# Patient Record
Sex: MALE | Race: WHITE | Employment: OTHER | URBAN - METROPOLITAN AREA
[De-identification: names, ages, dates, MRNs, and addresses within clinical notes are randomized per-mention and may not be internally consistent; named-entity substitution may affect disease eponyms.]

---

## 2023-05-10 ENCOUNTER — PREP FOR PROCEDURE (OUTPATIENT)
Dept: GASTROENTEROLOGY | Age: 63
End: 2023-05-10

## 2023-07-05 RX ORDER — SODIUM CHLORIDE 9 MG/ML
INJECTION, SOLUTION INTRAVENOUS PRN
Status: CANCELLED | OUTPATIENT
Start: 2023-07-05

## 2023-07-05 RX ORDER — SODIUM CHLORIDE 9 MG/ML
INJECTION, SOLUTION INTRAVENOUS CONTINUOUS
Status: CANCELLED | OUTPATIENT
Start: 2023-07-05

## 2023-07-05 RX ORDER — SODIUM CHLORIDE 0.9 % (FLUSH) 0.9 %
5-40 SYRINGE (ML) INJECTION EVERY 12 HOURS SCHEDULED
Status: CANCELLED | OUTPATIENT
Start: 2023-07-05

## 2023-07-07 ENCOUNTER — ANESTHESIA EVENT (OUTPATIENT)
Dept: ENDOSCOPY | Age: 63
End: 2023-07-07
Payer: MEDICARE

## 2023-07-07 ENCOUNTER — ANESTHESIA (OUTPATIENT)
Dept: ENDOSCOPY | Age: 63
End: 2023-07-07
Payer: MEDICARE

## 2023-07-07 ENCOUNTER — HOSPITAL ENCOUNTER (OUTPATIENT)
Age: 63
Setting detail: OUTPATIENT SURGERY
Discharge: HOME | End: 2023-07-07
Attending: INTERNAL MEDICINE | Admitting: INTERNAL MEDICINE
Payer: MEDICARE

## 2023-07-07 VITALS
TEMPERATURE: 97.2 F | HEART RATE: 75 BPM | OXYGEN SATURATION: 99 % | BODY MASS INDEX: 22.19 KG/M2 | HEIGHT: 70 IN | RESPIRATION RATE: 18 BRPM | DIASTOLIC BLOOD PRESSURE: 93 MMHG | WEIGHT: 155 LBS | SYSTOLIC BLOOD PRESSURE: 180 MMHG

## 2023-07-07 PROCEDURE — 3700000001 HC ADD 15 MINUTES (ANESTHESIA): Performed by: INTERNAL MEDICINE

## 2023-07-07 PROCEDURE — 6370000000 HC RX 637 (ALT 250 FOR IP): Performed by: INTERNAL MEDICINE

## 2023-07-07 PROCEDURE — 7100000010 HC PHASE II RECOVERY - FIRST 15 MIN: Performed by: INTERNAL MEDICINE

## 2023-07-07 PROCEDURE — G0105 COLORECTAL SCRN; HI RISK IND: HCPCS | Performed by: INTERNAL MEDICINE

## 2023-07-07 PROCEDURE — 6360000002 HC RX W HCPCS: Performed by: NURSE ANESTHETIST, CERTIFIED REGISTERED

## 2023-07-07 PROCEDURE — 3609027000 HC COLONOSCOPY: Performed by: INTERNAL MEDICINE

## 2023-07-07 PROCEDURE — 2500000003 HC RX 250 WO HCPCS: Performed by: NURSE ANESTHETIST, CERTIFIED REGISTERED

## 2023-07-07 PROCEDURE — 2709999900 HC NON-CHARGEABLE SUPPLY: Performed by: INTERNAL MEDICINE

## 2023-07-07 PROCEDURE — 2580000003 HC RX 258: Performed by: INTERNAL MEDICINE

## 2023-07-07 PROCEDURE — 3700000000 HC ANESTHESIA ATTENDED CARE: Performed by: INTERNAL MEDICINE

## 2023-07-07 RX ORDER — MELOXICAM 15 MG/1
15 TABLET ORAL
COMMUNITY
Start: 2023-05-12

## 2023-07-07 RX ORDER — LIDOCAINE HYDROCHLORIDE 20 MG/ML
INJECTION, SOLUTION EPIDURAL; INFILTRATION; INTRACAUDAL; PERINEURAL PRN
Status: DISCONTINUED | OUTPATIENT
Start: 2023-07-07 | End: 2023-07-07 | Stop reason: SDUPTHER

## 2023-07-07 RX ORDER — PROPOFOL 10 MG/ML
INJECTION, EMULSION INTRAVENOUS PRN
Status: DISCONTINUED | OUTPATIENT
Start: 2023-07-07 | End: 2023-07-07 | Stop reason: SDUPTHER

## 2023-07-07 RX ORDER — SODIUM CHLORIDE 9 MG/ML
INJECTION, SOLUTION INTRAVENOUS PRN
Status: DISCONTINUED | OUTPATIENT
Start: 2023-07-07 | End: 2023-07-07 | Stop reason: HOSPADM

## 2023-07-07 RX ORDER — SODIUM CHLORIDE 9 MG/ML
INJECTION, SOLUTION INTRAVENOUS CONTINUOUS
Status: DISCONTINUED | OUTPATIENT
Start: 2023-07-07 | End: 2023-07-07 | Stop reason: HOSPADM

## 2023-07-07 RX ORDER — QUETIAPINE FUMARATE 100 MG/1
100 TABLET, FILM COATED ORAL NIGHTLY
COMMUNITY

## 2023-07-07 RX ORDER — MAGNESIUM HYDROXIDE 1200 MG/15ML
LIQUID ORAL PRN
Status: DISCONTINUED | OUTPATIENT
Start: 2023-07-07 | End: 2023-07-07 | Stop reason: ALTCHOICE

## 2023-07-07 RX ORDER — BENAZEPRIL HYDROCHLORIDE 5 MG/1
5 TABLET, FILM COATED ORAL DAILY
COMMUNITY
Start: 2023-05-05

## 2023-07-07 RX ORDER — SODIUM CHLORIDE 0.9 % (FLUSH) 0.9 %
5-40 SYRINGE (ML) INJECTION EVERY 12 HOURS SCHEDULED
Status: DISCONTINUED | OUTPATIENT
Start: 2023-07-07 | End: 2023-07-07 | Stop reason: HOSPADM

## 2023-07-07 RX ORDER — BENAZEPRIL HYDROCHLORIDE 20 MG/1
20 TABLET ORAL DAILY
COMMUNITY
Start: 2019-09-10

## 2023-07-07 RX ORDER — SIMETHICONE 20 MG/.3ML
EMULSION ORAL PRN
Status: DISCONTINUED | OUTPATIENT
Start: 2023-07-07 | End: 2023-07-07 | Stop reason: ALTCHOICE

## 2023-07-07 RX ADMIN — PROPOFOL 100 MG: 10 INJECTION, EMULSION INTRAVENOUS at 11:06

## 2023-07-07 RX ADMIN — SODIUM CHLORIDE 500 ML: 9 INJECTION, SOLUTION INTRAVENOUS at 10:29

## 2023-07-07 RX ADMIN — PROPOFOL 100 MG: 10 INJECTION, EMULSION INTRAVENOUS at 11:16

## 2023-07-07 RX ADMIN — PROPOFOL 100 MG: 10 INJECTION, EMULSION INTRAVENOUS at 11:10

## 2023-07-07 RX ADMIN — LIDOCAINE HYDROCHLORIDE 40 MG: 20 INJECTION, SOLUTION EPIDURAL; INFILTRATION; INTRACAUDAL; PERINEURAL at 11:06

## 2023-07-07 RX ADMIN — PROPOFOL 50 MG: 10 INJECTION, EMULSION INTRAVENOUS at 11:22

## 2023-07-07 ASSESSMENT — PAIN - FUNCTIONAL ASSESSMENT: PAIN_FUNCTIONAL_ASSESSMENT: 0-10

## 2023-07-07 NOTE — ANESTHESIA POSTPROCEDURE EVALUATION
Department of Anesthesiology  Postprocedure Note    Patient: Hallie Desouza  MRN: [id number]  YOB: 1960  Date of evaluation: 7/7/2023      Procedure Summary     Date: 07/07/23 Room / Location: [address] [place] [address]    Anesthesia Start: 1103 Anesthesia Stop: 1127    Procedure: COLORECTAL CANCER SCREENING, HIGH RISK Diagnosis:       Personal history of colonic polyps      (Personal history of colonic polyps [Z86.010])    Surgeons: Renate Dancer, MD Responsible Provider: PERRI Castro CRNA    Anesthesia Type: MAC ASA Status: 2          Anesthesia Type: No value filed.     Alicia Phase I:      Alicia Phase II:        Anesthesia Post Evaluation    Patient location during evaluation: bedside  Patient participation: complete - patient participated  Level of consciousness: awake  Airway patency: patent  Nausea & Vomiting: no nausea and no vomiting  Complications: no  Cardiovascular status: blood pressure returned to baseline  Respiratory status: acceptable  Hydration status: euvolemic

## 2023-11-08 ENCOUNTER — ANCILLARY PROCEDURE (OUTPATIENT)
Dept: RADIOLOGY | Facility: CLINIC | Age: 63
End: 2023-11-08
Payer: MEDICARE

## 2023-11-08 DIAGNOSIS — E78.00 PURE HYPERCHOLESTEROLEMIA, UNSPECIFIED: ICD-10-CM

## 2023-11-08 PROCEDURE — 75571 CT HRT W/O DYE W/CA TEST: CPT

## 2024-04-12 ENCOUNTER — APPOINTMENT (OUTPATIENT)
Dept: CARDIOLOGY | Facility: CLINIC | Age: 64
End: 2024-04-12
Payer: MEDICARE

## 2024-04-15 ENCOUNTER — TELEPHONE (OUTPATIENT)
Dept: CARDIOLOGY | Facility: CLINIC | Age: 64
End: 2024-04-15
Payer: MEDICARE

## 2024-04-15 NOTE — TELEPHONE ENCOUNTER
Pt discharged [facility] rehab 4/15 missed 4/12 appt please advise where to add to schedule thanks

## 2024-04-23 ENCOUNTER — TELEPHONE (OUTPATIENT)
Dept: CARDIOLOGY | Facility: CLINIC | Age: 64
End: 2024-04-23
Payer: MEDICARE

## 2024-04-23 NOTE — TELEPHONE ENCOUNTER
V/M on nursing line from Renetta at [facility]. They are requesting permission to hold Aspirin 6 days prior to pain management procedure and resume 24 hours post    Phone [phone]  Fax [phone]    To Dr. Rosangela Cancino MD for review.

## 2024-04-24 ENCOUNTER — TELEPHONE (OUTPATIENT)
Dept: CARDIOLOGY | Facility: CLINIC | Age: 64
End: 2024-04-24
Payer: MEDICARE

## 2024-04-24 RX ORDER — PYRIDOXINE HCL (VITAMIN B6) 100 MG
200 TABLET ORAL DAILY
COMMUNITY

## 2024-04-24 RX ORDER — TRAMADOL HYDROCHLORIDE 50 MG/1
50 TABLET ORAL DAILY PRN
COMMUNITY
Start: 2018-12-07

## 2024-04-24 RX ORDER — CHOLECALCIFEROL (VITAMIN D3) 125 MCG
125 CAPSULE ORAL DAILY
COMMUNITY

## 2024-04-24 RX ORDER — LISINOPRIL 40 MG/1
40 TABLET ORAL DAILY
COMMUNITY
Start: 2024-04-15 | End: 2024-05-06 | Stop reason: SDUPTHER

## 2024-04-24 RX ORDER — METOPROLOL SUCCINATE 25 MG/1
25 TABLET, EXTENDED RELEASE ORAL DAILY
COMMUNITY

## 2024-04-24 RX ORDER — ASCORBIC ACID 500 MG
1000 TABLET,CHEWABLE ORAL DAILY
COMMUNITY

## 2024-04-24 RX ORDER — IBUPROFEN 600 MG/1
600 TABLET ORAL EVERY 6 HOURS PRN
COMMUNITY
Start: 2022-12-06

## 2024-04-24 RX ORDER — MELOXICAM 15 MG/1
15 TABLET ORAL DAILY PRN
COMMUNITY

## 2024-04-24 RX ORDER — DIAZEPAM 5 MG/1
5 TABLET ORAL DAILY PRN
COMMUNITY

## 2024-04-24 RX ORDER — BACLOFEN 10 MG/1
10 TABLET ORAL NIGHTLY
COMMUNITY

## 2024-04-24 RX ORDER — ACETAMINOPHEN 500 MG
1000 TABLET ORAL EVERY 6 HOURS PRN
COMMUNITY
Start: 2022-10-31

## 2024-04-24 RX ORDER — QUETIAPINE FUMARATE 50 MG/1
50 TABLET, FILM COATED ORAL NIGHTLY
COMMUNITY

## 2024-04-24 NOTE — TELEPHONE ENCOUNTER
Patient wife left  inquiring if patient can use meloxicam or ibuprofen for his back pain.  Patient wife reports Tylenol does not help the pain. Patient also has an RX for tramadol. Inquiring to use temporarily until he goes in for spinal injections.       To Dr. Rosangela Cancino MD

## 2024-04-25 NOTE — TELEPHONE ENCOUNTER
Patient was seen in hospital with consult by you. Notes under media tab    To Dr. Rosangela Cancino MD for review.

## 2024-04-26 NOTE — TELEPHONE ENCOUNTER
Wife called into office looking for response. Advised awaiting response. Inquiring if any of his medications will interact with cardiac medications?

## 2024-04-29 ENCOUNTER — OFFICE VISIT (OUTPATIENT)
Dept: OPHTHALMOLOGY | Facility: CLINIC | Age: 64
End: 2024-04-29
Payer: MEDICARE

## 2024-04-29 DIAGNOSIS — Z86.73: Primary | ICD-10-CM

## 2024-04-29 DIAGNOSIS — H53.461 HEMIANOPIA, HOMONYMOUS, RIGHT: ICD-10-CM

## 2024-04-29 PROCEDURE — 99204 OFFICE O/P NEW MOD 45 MIN: CPT | Performed by: PSYCHIATRY & NEUROLOGY

## 2024-04-29 PROCEDURE — 92083 EXTENDED VISUAL FIELD XM: CPT | Performed by: PSYCHIATRY & NEUROLOGY

## 2024-04-29 ASSESSMENT — CONF VISUAL FIELD
OD_INFERIOR_TEMPORAL_RESTRICTION: 2
OD_SUPERIOR_NASAL_RESTRICTION: 0
OS_INFERIOR_NASAL_RESTRICTION: 2
OD_SUPERIOR_TEMPORAL_RESTRICTION: 1
OS_SUPERIOR_NASAL_RESTRICTION: 1

## 2024-04-29 ASSESSMENT — ENCOUNTER SYMPTOMS
RESPIRATORY NEGATIVE: 0
PSYCHIATRIC NEGATIVE: 0
NEUROLOGICAL NEGATIVE: 0
EYES NEGATIVE: 1
CARDIOVASCULAR NEGATIVE: 0
HEMATOLOGIC/LYMPHATIC NEGATIVE: 0
CONSTITUTIONAL NEGATIVE: 0
MUSCULOSKELETAL NEGATIVE: 0
ENDOCRINE NEGATIVE: 0
GASTROINTESTINAL NEGATIVE: 0
ALLERGIC/IMMUNOLOGIC NEGATIVE: 0

## 2024-04-29 ASSESSMENT — VISUAL ACUITY
OS_SC: 20/25
METHOD: SNELLEN - LINEAR
OS_PH_SC: 20/20-2
OD_PH_SC: 20/25
OD_SC: 20/40

## 2024-04-29 ASSESSMENT — CUP TO DISC RATIO
OD_RATIO: 0.1
OS_RATIO: 0.1

## 2024-04-29 ASSESSMENT — SLIT LAMP EXAM - LIDS
COMMENTS: NORMAL
COMMENTS: NORMAL

## 2024-04-29 ASSESSMENT — TONOMETRY
OS_IOP_MMHG: 12
IOP_METHOD: GOLDMANN APPLANATION
OD_IOP_MMHG: 12

## 2024-04-29 ASSESSMENT — EXTERNAL EXAM - RIGHT EYE: OD_EXAM: NORMAL

## 2024-04-29 ASSESSMENT — EXTERNAL EXAM - LEFT EYE: OS_EXAM: NORMAL

## 2024-04-29 NOTE — PROGRESS NOTES
"Assessment and Plan    04/04/2024 CTA head & neck, which I personally reviewed, shows large of left posterior cerebral artery opacification.  04/04/2024 CT head without contrast, which I personally reviewed, shows hypodensity in the left posterior cerebral artery territory and by report from [facility], shows \"There is occlusion in the C3 segment of the left posterior cerebral artery.    There is calcified plaque at the origin of the right vertebral artery  contributing to moderate stenosis.    Calcified plaque is noted in the V2 segment of the left vertebral artery  contributing to moderate stenosis.\"  04/04/2024 MRI brain without contrast, which I personally reviewed, shows left posterior cerebral artery territory diffusion and FLAIR changes consistent with stroke and by report from [facility], shows \"There is diffusion restriction in the posterior and medial aspect of the left  temporal lobe and left occipital lobe consistent with a left PCA territory  infarct.    Periventricular and subcortical white matter T2 and T2 FLAIR hyperintense signal  is noted consistent with chronic microvascular ischemic change.    There is diffuse age-related cortical atrophy.\"    04/29/2024 HVF 24-2 right homonymous hemianopsia OD fovea 34 MD -12.55 & OS fovea 35 MD -15.80.    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 I discussed the permanent nature of these findings and related restrictions on driving " as well as potential referral to the [facility] for low vision services.    Plan    Pursue homonymous hemianopsia precautions including no motor vehicle operation.  Low vision care.  Secondary stroke risk reduction.    Follow up as needed. (Dilated 4/29/2024)

## 2024-04-29 NOTE — LETTER
"April 29, 2024     Del Claire MD  [address]  [facility]  [facility] [address]    Patient: Bryan Hackett   YOB: 1960   Date of Visit: 4/29/2024     Dear Dr. Del Claire MD:    I am writing to share my findings regarding our shared patient Bryan Hackett from his visit with me on 4/29/2024.    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 Del Claire regarding stroke.    He notes vision loss of the right eye since 1/30/2024. The vision seemed worse about a month ago. He can tell that the right half is more affected.  Last edited by Rudi Beltran MD PhD on 4/29/2024  9:04 AM.        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  04/04/2024 CT head without contrast, which I personally reviewed, shows hypodensity in the left posterior cerebral artery territory and by report from [facility], shows \"There is occlusion in the C3 segment of the left posterior cerebral artery.    There is calcified plaque at the origin of the right vertebral artery  contributing to moderate stenosis.    Calcified plaque is noted in the V2 segment of the left vertebral artery  contributing to moderate " "stenosis.\"  04/04/2024 MRI brain without contrast, which I personally reviewed, shows left posterior cerebral artery territory diffusion and FLAIR changes consistent with stroke and by report from [facility], shows \"There is diffusion restriction in the posterior and medial aspect of the left  temporal lobe and left occipital lobe consistent with a left PCA territory  infarct.    Periventricular and subcortical white matter T2 and T2 FLAIR hyperintense signal  is noted consistent with chronic microvascular ischemic change.    There is diffuse age-related cortical atrophy.\"    04/29/2024 HVF 24-2 right homonymous hemianopsia OD fovea 34 MD -12.55 & OS fovea 35 MD -15.80.    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 I discussed the permanent nature of these findings and related restrictions on driving as well as potential referral to the [facility] for low vision services.    Plan    Pursue homonymous hemianopsia precautions including no motor vehicle operation.  Low vision care.  Secondary stroke risk reduction.    Follow up as needed. (Dilated 4/29/2024)      Below you will find my full examination. I appreciate the opportunity to see Bryan Hackett today and to share in his care with you. Please contact me if you have questions for me regarding this visit or if I can be of assistance to another of your patients with neuro-ophthalmological problems.    Sincerely,    Rudi" BEATRIZ Beltran MD PhD    CC:   No Recipients      Base Eye Exam       Visual Acuity (Snellen - Linear)         Right Left    Dist sc 20/40 20/25    Dist ph sc 20/25 20/20-2              Tonometry (Goldmann Applanation, 8:11 AM)         Right Left    Pressure 12 12              Pupils         Dark Light Shape React APD    Right 5 3 Round Brisk None    Left 5 3 Round Brisk None              Visual Fields         Left Right    Restrictions Total superior nasal deficiency; Partial inner inferior nasal deficiency Total superior temporal deficiency; Partial inner inferior temporal deficiency   See Gaffney visual field (HVF) for more detail.             Extraocular Movement         Right Left     Full, Ortho Full, Ortho              Neuro/Psych       Oriented x3: Yes              Dilation       Both eyes: 1% Mydriacyl & 2.5% Checo  @ 9:10 AM                  Additional Tests       Color         Right Left    Ishihara 7 10                  Slit Lamp and Fundus Exam       External Exam         Right Left    External Normal Normal              Slit Lamp Exam         Right Left    Lids/Lashes Normal Normal    Conjunctiva/Sclera White and quiet White and quiet    Cornea RK scars RK scars    Anterior Chamber Deep and quiet Deep and quiet    Iris Round and reactive Round and reactive    Lens Posterior chamber intraocular lens Posterior chamber intraocular lens    Anterior Vitreous Normal Normal              Fundus Exam         Right Left    Disc Normal Normal    C/D Ratio 0.1 0.1    Macula Normal Normal    Vessels Normal Normal    Periphery Normal Normal

## 2024-05-06 ENCOUNTER — OFFICE VISIT (OUTPATIENT)
Dept: CARDIOLOGY | Facility: CLINIC | Age: 64
End: 2024-05-06
Payer: MEDICARE

## 2024-05-06 VITALS
DIASTOLIC BLOOD PRESSURE: 60 MMHG | SYSTOLIC BLOOD PRESSURE: 98 MMHG | HEART RATE: 82 BPM | WEIGHT: 153 LBS | BODY MASS INDEX: 21.9 KG/M2 | HEIGHT: 70 IN

## 2024-05-06 DIAGNOSIS — R93.1 AGATSTON CORONARY ARTERY CALCIUM SCORE LESS THAN 100: ICD-10-CM

## 2024-05-06 DIAGNOSIS — E78.2 MIXED HYPERLIPIDEMIA: ICD-10-CM

## 2024-05-06 DIAGNOSIS — I10 ESSENTIAL HYPERTENSION: ICD-10-CM

## 2024-05-06 DIAGNOSIS — I51.89 ACQUIRED LEFT VENTRICULAR OUTFLOW TRACT OBSTRUCTION: Primary | ICD-10-CM

## 2024-05-06 DIAGNOSIS — Z86.73 HISTORY OF STROKE: ICD-10-CM

## 2024-05-06 DIAGNOSIS — I95.2 HYPOTENSION DUE TO DRUGS: ICD-10-CM

## 2024-05-06 PROBLEM — Z87.891 FORMER SMOKER: Status: ACTIVE | Noted: 2024-05-06

## 2024-05-06 PROCEDURE — 3078F DIAST BP <80 MM HG: CPT | Performed by: INTERNAL MEDICINE

## 2024-05-06 PROCEDURE — 3008F BODY MASS INDEX DOCD: CPT | Performed by: INTERNAL MEDICINE

## 2024-05-06 PROCEDURE — 1160F RVW MEDS BY RX/DR IN RCRD: CPT | Performed by: INTERNAL MEDICINE

## 2024-05-06 PROCEDURE — 1159F MED LIST DOCD IN RCRD: CPT | Performed by: INTERNAL MEDICINE

## 2024-05-06 PROCEDURE — 3074F SYST BP LT 130 MM HG: CPT | Performed by: INTERNAL MEDICINE

## 2024-05-06 PROCEDURE — 1157F ADVNC CARE PLAN IN RCRD: CPT | Performed by: INTERNAL MEDICINE

## 2024-05-06 PROCEDURE — 99214 OFFICE O/P EST MOD 30 MIN: CPT | Performed by: INTERNAL MEDICINE

## 2024-05-06 PROCEDURE — 1036F TOBACCO NON-USER: CPT | Performed by: INTERNAL MEDICINE

## 2024-05-06 RX ORDER — LISINOPRIL 20 MG/1
20 TABLET ORAL DAILY
Qty: 90 TABLET | Refills: 3 | Status: SHIPPED | OUTPATIENT
Start: 2024-05-06

## 2024-05-06 RX ORDER — FAMOTIDINE 20 MG/1
TABLET, FILM COATED ORAL
COMMUNITY

## 2024-05-06 RX ORDER — TAMSULOSIN HYDROCHLORIDE 0.4 MG/1
0.4 CAPSULE ORAL DAILY
COMMUNITY

## 2024-05-06 RX ORDER — ASPIRIN 81 MG/1
81 TABLET ORAL DAILY
COMMUNITY

## 2024-05-06 NOTE — LETTER
May 6, 2024     Del Zuniga DO  [address]  [facility] [address]    Patient: Bryan Hackett   YOB: 1960   Date of Visit: 5/6/2024       Dear Dr. Del Zuniga DO:    Thank you for referring Bryan Hackett to me for evaluation. Below are my notes for this consultation.  If you have questions, please do not hesitate to call me. I look forward to following your patient along with you.       Sincerely,     Rosangela Cancino MD      CC: No Recipients  ______________________________________________________________________________________    Subjective   Bryan Hackett is a 64 y.o. male       Chief Complaint    Hospital Follow-up          HPI   Patient is in the office after recent evaluation as an inpatient at [facility] for obstructive LV outflow pathology caused by hyperdynamic ventricular contraction in the context of sigmoid septum.  While he was in the hospital he was therefore perforated cecum and had successful surgical repair with no complications.  Initial echocardiogram revealed a resting gradient of 200 mmHg in the LVOT with YULI but no mitral regurgitation.  He was hypertensive and was beta-blocker therapy were instituted his problem completely resolved and his murmur disappeared.  He had coronary calcium score this year which demonstrated 73 score mostly in the LAD.  He has been placed on aspirin and statin.  He had problem with alcohol in the past but apparently he has not drank alcohol since discharge from the hospital.  Review of system essentially normal except for occasional dizziness and his physical examination today revealed mild systemic hypertension, systolic pressure was 95 mmHg.  There is no cardiac murmurs on exam his vascular examination was normal his lungs sounded normal.      Assessment/recommendations:    1-dynamic LVOT obstruction while tachycardic and hypertensive in the hospital with sigmoid basal septum.  Resting gradient was up to 200 mmHg.  Beta-blocker  "therapy and hypertension control led to complete resolution of the problem.  Currently has no indication of outflow obstruction with no cardiac murmurs.  He was advised to continue beta-blocker therapy.  No need for cardiac testing  2-hypertension, currently his pressure is on the low side and therefore the lisinopril dose will be reduced down to 20 mg daily while maintaining beta-blocker therapy he will continue to monitor blood pressure reading at home  3-elevated coronary calcium score at 73.  Presently on aspirin and atorvastatin, lipid profile will be scheduled in couple of months.  He had nuclear stress test in the past which apparently came back normal  4-small stroke on brain MRI recently with no clinical symptoms.  No recurrences and will continue aspirin and statin.  Review of Systems   Constitutional: Positive for malaise/fatigue.   All other systems reviewed and are negative.           Vitals:    05/06/24 1408 05/06/24 1422   BP: 92/62 98/60   BP Location: Right arm Right arm   Patient Position: Sitting Sitting   Pulse: 82    Weight: 69.4 kg (153 lb)    Height: 1.778 m (5' 10\")         Objective   Physical Exam  Constitutional:       Appearance: Normal appearance.   HENT:      Nose: Nose normal.   Neck:      Vascular: No carotid bruit.   Cardiovascular:      Rate and Rhythm: Normal rate.      Pulses: Normal pulses.      Heart sounds: Normal heart sounds.   Pulmonary:      Effort: Pulmonary effort is normal.   Abdominal:      General: Bowel sounds are normal.      Palpations: Abdomen is soft.   Musculoskeletal:         General: Normal range of motion.      Cervical back: Normal range of motion.      Right lower leg: No edema.      Left lower leg: No edema.   Skin:     General: Skin is warm and dry.   Neurological:      General: No focal deficit present.      Mental Status: He is alert.   Psychiatric:         Mood and Affect: Mood normal.         Behavior: Behavior normal.         Thought Content: Thought " content normal.         Judgment: Judgment normal.         Allergies  Patient has no known allergies.     Current Medications    Current Outpatient Medications:   •  acetaminophen (Tylenol Extra Strength) 500 mg tablet, Take 2 tablets (1,000 mg) by mouth every 6 hours if needed for moderate pain (4 - 6)., Disp: , Rfl:   •  ascorbic acid (Vitamin C) 500 mg chewable tablet, Chew 2 tablets (1,000 mg) once daily., Disp: , Rfl:   •  aspirin 81 mg EC tablet, Take 1 tablet (81 mg) by mouth once daily., Disp: , Rfl:   •  baclofen (Lioresal) 10 mg tablet, Take 1 tablet (10 mg) by mouth once daily at bedtime., Disp: , Rfl:   •  cholecalciferol (Vitamin D-3) 125 MCG (5000 UT) capsule, Take 1 capsule (125 mcg) by mouth once daily., Disp: , Rfl:   •  diazePAM (Valium) 5 mg tablet, Take 1 tablet (5 mg) by mouth once daily as needed for anxiety., Disp: , Rfl:   •  famotidine (Pepcid) 20 mg tablet, Take by mouth., Disp: , Rfl:   •  ibuprofen 600 mg tablet, Take 1 tablet (600 mg) by mouth every 6 hours if needed for moderate pain (4 - 6)., Disp: , Rfl:   •  magnesium glycinate 100 mg tablet, Take 1 tablet by mouth once daily at bedtime., Disp: , Rfl:   •  meloxicam (Mobic) 15 mg tablet, Take 1 tablet (15 mg) by mouth once daily as needed for moderate pain (4 - 6)., Disp: , Rfl:   •  metoprolol succinate XL (Toprol-XL) 25 mg 24 hr tablet, Take 1 tablet (25 mg) by mouth once daily. Do not crush or chew., Disp: , Rfl:   •  pyridoxine (Vitamin B-6) 100 mg tablet, Take 2 tablets (200 mg) by mouth once daily., Disp: , Rfl:   •  QUEtiapine (SEROquel) 50 mg tablet, Take 1 tablet (50 mg) by mouth once daily at bedtime., Disp: , Rfl:   •  tamsulosin (Flomax) 0.4 mg 24 hr capsule, Take 1 capsule (0.4 mg) by mouth once daily., Disp: , Rfl:   •  traMADol (Ultram) 50 mg tablet, Take 1 tablet (50 mg) by mouth once daily as needed for severe pain (7 - 10)., Disp: , Rfl:   •  lisinopril 20 mg tablet, Take 1 tablet (20 mg) by mouth once daily., Disp:  90 tablet, Rfl: 3                     Assessment/Plan   1. Acquired left ventricular outflow tract obstruction        2. Essential hypertension  lisinopril 20 mg tablet    Follow Up In Cardiology      3. Mixed hyperlipidemia  Lipid Panel    Lipid Panel      4. Agatston coronary artery calcium score less than 100  Lipid Panel    Lipid Panel      5. BMI 21.0-21.9, adult        6. History of stroke        7. Hypotension due to drugs                 Scribe Attestation  By signing my name below, IVicky LPN  , Scribe   attest that this documentation has been prepared under the direction and in the presence of Rosangela Cancino MD.     Provider Attestation - Scribe documentation    All medical record entries made by the Scribe were at my direction and personally dictated by me. I have reviewed the chart and agree that the record accurately reflects my personal performance of the history, physical exam, discussion and plan.

## 2024-05-06 NOTE — PROGRESS NOTES
Subjective   Bryan Hackett is a 64 y.o. male       Chief Complaint    Hospital Follow-up          HPI   Patient is in the office after recent evaluation as an inpatient at [facility] for obstructive LV outflow pathology caused by hyperdynamic ventricular contraction in the context of sigmoid septum.  While he was in the hospital he was therefore perforated cecum and had successful surgical repair with no complications.  Initial echocardiogram revealed a resting gradient of 200 mmHg in the LVOT with YULI but no mitral regurgitation.  He was hypertensive and was beta-blocker therapy were instituted his problem completely resolved and his murmur disappeared.  He had coronary calcium score this year which demonstrated 73 score mostly in the LAD.  He has been placed on aspirin and statin.  He had problem with alcohol in the past but apparently he has not drank alcohol since discharge from the hospital.  Review of system essentially normal except for occasional dizziness and his physical examination today revealed mild systemic hypertension, systolic pressure was 95 mmHg.  There is no cardiac murmurs on exam his vascular examination was normal his lungs sounded normal.      Assessment/recommendations:    1-dynamic LVOT obstruction while tachycardic and hypertensive in the hospital with sigmoid basal septum.  Resting gradient was up to 200 mmHg.  Beta-blocker therapy and hypertension control led to complete resolution of the problem.  Currently has no indication of outflow obstruction with no cardiac murmurs.  He was advised to continue beta-blocker therapy.  No need for cardiac testing  2-hypertension, currently his pressure is on the low side and therefore the lisinopril dose will be reduced down to 20 mg daily while maintaining beta-blocker therapy he will continue to monitor blood pressure reading at home  3-elevated coronary calcium score at 73.  Presently on aspirin and atorvastatin, lipid profile will be scheduled in  "couple of months.  He had nuclear stress test in the past which apparently came back normal  4-small stroke on brain MRI recently with no clinical symptoms.  No recurrences and will continue aspirin and statin.  Review of Systems   Constitutional: Positive for malaise/fatigue.   All other systems reviewed and are negative.           Vitals:    05/06/24 1408 05/06/24 1422   BP: 92/62 98/60   BP Location: Right arm Right arm   Patient Position: Sitting Sitting   Pulse: 82    Weight: 69.4 kg (153 lb)    Height: 1.778 m (5' 10\")         Objective   Physical Exam  Constitutional:       Appearance: Normal appearance.   HENT:      Nose: Nose normal.   Neck:      Vascular: No carotid bruit.   Cardiovascular:      Rate and Rhythm: Normal rate.      Pulses: Normal pulses.      Heart sounds: Normal heart sounds.   Pulmonary:      Effort: Pulmonary effort is normal.   Abdominal:      General: Bowel sounds are normal.      Palpations: Abdomen is soft.   Musculoskeletal:         General: Normal range of motion.      Cervical back: Normal range of motion.      Right lower leg: No edema.      Left lower leg: No edema.   Skin:     General: Skin is warm and dry.   Neurological:      General: No focal deficit present.      Mental Status: He is alert.   Psychiatric:         Mood and Affect: Mood normal.         Behavior: Behavior normal.         Thought Content: Thought content normal.         Judgment: Judgment normal.         Allergies  Patient has no known allergies.     Current Medications    Current Outpatient Medications:     acetaminophen (Tylenol Extra Strength) 500 mg tablet, Take 2 tablets (1,000 mg) by mouth every 6 hours if needed for moderate pain (4 - 6)., Disp: , Rfl:     ascorbic acid (Vitamin C) 500 mg chewable tablet, Chew 2 tablets (1,000 mg) once daily., Disp: , Rfl:     aspirin 81 mg EC tablet, Take 1 tablet (81 mg) by mouth once daily., Disp: , Rfl:     baclofen (Lioresal) 10 mg tablet, Take 1 tablet (10 mg) by " mouth once daily at bedtime., Disp: , Rfl:     cholecalciferol (Vitamin D-3) 125 MCG (5000 UT) capsule, Take 1 capsule (125 mcg) by mouth once daily., Disp: , Rfl:     diazePAM (Valium) 5 mg tablet, Take 1 tablet (5 mg) by mouth once daily as needed for anxiety., Disp: , Rfl:     famotidine (Pepcid) 20 mg tablet, Take by mouth., Disp: , Rfl:     ibuprofen 600 mg tablet, Take 1 tablet (600 mg) by mouth every 6 hours if needed for moderate pain (4 - 6)., Disp: , Rfl:     magnesium glycinate 100 mg tablet, Take 1 tablet by mouth once daily at bedtime., Disp: , Rfl:     meloxicam (Mobic) 15 mg tablet, Take 1 tablet (15 mg) by mouth once daily as needed for moderate pain (4 - 6)., Disp: , Rfl:     metoprolol succinate XL (Toprol-XL) 25 mg 24 hr tablet, Take 1 tablet (25 mg) by mouth once daily. Do not crush or chew., Disp: , Rfl:     pyridoxine (Vitamin B-6) 100 mg tablet, Take 2 tablets (200 mg) by mouth once daily., Disp: , Rfl:     QUEtiapine (SEROquel) 50 mg tablet, Take 1 tablet (50 mg) by mouth once daily at bedtime., Disp: , Rfl:     tamsulosin (Flomax) 0.4 mg 24 hr capsule, Take 1 capsule (0.4 mg) by mouth once daily., Disp: , Rfl:     traMADol (Ultram) 50 mg tablet, Take 1 tablet (50 mg) by mouth once daily as needed for severe pain (7 - 10)., Disp: , Rfl:     lisinopril 20 mg tablet, Take 1 tablet (20 mg) by mouth once daily., Disp: 90 tablet, Rfl: 3                     Assessment/Plan   1. Acquired left ventricular outflow tract obstruction        2. Essential hypertension  lisinopril 20 mg tablet    Follow Up In Cardiology      3. Mixed hyperlipidemia  Lipid Panel    Lipid Panel      4. Agatston coronary artery calcium score less than 100  Lipid Panel    Lipid Panel      5. BMI 21.0-21.9, adult        6. History of stroke        7. Hypotension due to drugs                 Scribe Attestation  By signing my name below, I, Vicky M. LPN  , Scribe   attest that this documentation has been prepared under the  direction and in the presence of Rosangela Cancino MD.     Provider Attestation - Scribe documentation    All medical record entries made by the Scribe were at my direction and personally dictated by me. I have reviewed the chart and agree that the record accurately reflects my personal performance of the history, physical exam, discussion and plan.

## 2024-05-06 NOTE — PATIENT INSTRUCTIONS
Please bring all medicines, vitamins, and herbal supplements with you when you come to the office.    Prescriptions will not be filled unless you are compliant with your follow up appointments or have a follow up appointment scheduled as per instruction of your physician. Refills should be requested at the time of your visit. Fall Prevention Education Given

## 2024-06-28 ENCOUNTER — APPOINTMENT (OUTPATIENT)
Dept: OPHTHALMOLOGY | Facility: CLINIC | Age: 64
End: 2024-06-28
Payer: MEDICARE

## 2024-07-03 LAB
NON-UH HIE CHOL/HDL RATIO: 2
NON-UH HIE CHOLESTEROL: 170 MG/DL (ref 140–200)
NON-UH HIE HDL CHOLESTEROL: 83 MG/DL (ref 23–92)
NON-UH HIE LDL CHOLESTEROL,CALCULATED: 74 MG/DL (ref 0–100)
NON-UH HIE TRIGLYCERIDE W/REFLEX: 63 MG/DL (ref 0–149)
NON-UH HIE VLDL CHOLESTEROL: 12 MG/DL

## 2024-07-31 ENCOUNTER — TELEPHONE (OUTPATIENT)
Dept: CARDIOLOGY | Facility: CLINIC | Age: 64
End: 2024-07-31
Payer: MEDICARE

## 2024-07-31 DIAGNOSIS — R06.02 SHORTNESS OF BREATH: ICD-10-CM

## 2024-07-31 NOTE — TELEPHONE ENCOUNTER
Patient wife phoned in left vm via the nurse line states Patient lately has been experiencing an increase of shortness of breath with exertion, when going out to work in the yard. Patient wife reports no other accompanying symptoms like chest pain. Wife states does subside with rest. Inquiring if he should have any testing done to assess.     No documented stress test on file. Did previously have calcium score test.     To Dr. Rosangela Cancino MD      The patient is a 75y Female complaining of headache.

## 2024-08-02 NOTE — TELEPHONE ENCOUNTER
Wife phones back, they are agreeable to stress test.     Order prepped and sent to Dr. Rosangela Cancino MD  for signature    Task sent to  to call and arrange once order signed.

## 2024-08-27 ENCOUNTER — HOSPITAL ENCOUNTER (OUTPATIENT)
Dept: RADIOLOGY | Facility: CLINIC | Age: 64
Discharge: HOME | End: 2024-08-27
Payer: MEDICARE

## 2024-08-27 ENCOUNTER — HOSPITAL ENCOUNTER (OUTPATIENT)
Dept: CARDIOLOGY | Facility: CLINIC | Age: 64
Discharge: HOME | End: 2024-08-27
Payer: MEDICARE

## 2024-08-27 VITALS — SYSTOLIC BLOOD PRESSURE: 134 MMHG | DIASTOLIC BLOOD PRESSURE: 92 MMHG | HEART RATE: 60 BPM

## 2024-08-27 DIAGNOSIS — R06.02 SHORTNESS OF BREATH: ICD-10-CM

## 2024-08-27 PROCEDURE — 93017 CV STRESS TEST TRACING ONLY: CPT

## 2024-08-27 PROCEDURE — 3430000001 HC RX 343 DIAGNOSTIC RADIOPHARMACEUTICALS: Performed by: INTERNAL MEDICINE

## 2024-08-27 PROCEDURE — 2500000004 HC RX 250 GENERAL PHARMACY W/ HCPCS (ALT 636 FOR OP/ED): Performed by: INTERNAL MEDICINE

## 2024-08-27 PROCEDURE — A9502 TC99M TETROFOSMIN: HCPCS | Performed by: INTERNAL MEDICINE

## 2024-08-27 PROCEDURE — 78452 HT MUSCLE IMAGE SPECT MULT: CPT

## 2024-08-27 RX ORDER — REGADENOSON 0.08 MG/ML
0.4 INJECTION, SOLUTION INTRAVENOUS ONCE
Status: COMPLETED | OUTPATIENT
Start: 2024-08-27 | End: 2024-08-27

## 2024-08-29 ENCOUNTER — TELEPHONE (OUTPATIENT)
Dept: CARDIOLOGY | Facility: CLINIC | Age: 64
End: 2024-08-29
Payer: MEDICARE

## 2024-08-29 NOTE — TELEPHONE ENCOUNTER
Result Communication    Resulted Orders   Nuclear Stress Test    Narrative    Interpreted By:  Elyse Cancino and Giannuzzi Michael   STUDY:  MYOCARDIAL PERFUSION STRESS TEST WITH LEXISCAN      Performing facility:  [facility],  [address] [place] [address], [address],  [place] [address]  [facility] Provider:  Elyse Cancino MD, [facility]  PCP:  Dr. MIGUEL ANGEL Zuniga  Supervising provider:  Elyse Cancino MD, [facility]      INDICATION:  SOB;      HISTORY:  Gender:  M; Age:  65 y/o ; Height:  .8 cm cm; Weight:   WT 69.4  kg kg.      High Cholesterol;  HTN;  SOB;  Quit smoking unknown years ago.      COMPARISON:  No comparison.          ACCESSION NUMBER(S):  [id number]      ORDERING CLINICIAN:  ELYSE CANCINO      TECHNIQUE:  ONE DAY protocol.  Stress injection: Date:8-27-24, 28.5 mCi of Myoview IV 20 seconds  after rapid injection of Lexiscan. Rest injection: Date: 8-27-24, 9.0  mCi of Myoview IV at rest. The patient had a rapid injection of  0.4  mg of Lexiscan IV over 10 seconds. Imaging was performed by  gated  tomographic technique. Reason for Lexiscan:  SOB      STRESS TEST DATA:  Resting heart rate was 60 BPM.  Resting blood pressure was 134/92 mmHg.  Peak blood pressure was 122/84 mmHg.  Peak heart rate was 99 BPM.      TEST TERMINATED DUE TO:  Protocol completed      FINDINGS:  STRESS TEST RESULTS:      Resting electrocardiogram revealed normal sinus rhythm.  There were no significant ischemic ECG changes or dysrhythmias.  The patient did not have chest pains/symptoms during procedure.  There was a normal recovery phase.      IMAGING RESULTS:      Image quality was good.  Rest and stress tomographic images were reviewed and revealed normal  perfusion without evidence of ischemia, myocardial infarction, or  left ventricular dilatation with stress. Overall left ventricular  systolic function appeared to be normal without regional wall motion  abnormalities. Ejection fraction was 75%.  TID is 0.87  and is normal.  There was no evidence of  attenuation artifact.        Impression    Normal [employer] Myoview cardiac perfusion stress test.  No evidence of ischemia or myocardial infarction by perfusion imaging.  Normal left ventricular systolic function, ejection fraction 75%.  No previous studies are available for comparison.      Signed by: Rosangela Cancino 8/27/2024 5:26 PM  Dictation workstation:   [id number]       9:37 AM      Results were successfully communicated with the patient and they acknowledged their understanding.

## 2024-08-29 NOTE — TELEPHONE ENCOUNTER
----- Message from Rosangela Cancino sent at 8/28/2024  4:11 PM EDT -----  Let him know the nuclear stress test came back normal  ----- Message -----  From: Interface, Radiology Results In  Sent: 8/27/2024   5:28 PM EDT  To: Rosangela Cancino MD

## 2024-09-05 ENCOUNTER — APPOINTMENT (OUTPATIENT)
Dept: ORTHOPEDIC SURGERY | Facility: HOSPITAL | Age: 64
End: 2024-09-05
Payer: MEDICARE

## 2024-09-05 DIAGNOSIS — M25.511 RIGHT SHOULDER PAIN, UNSPECIFIED CHRONICITY: ICD-10-CM

## 2024-09-17 ENCOUNTER — TELEPHONE (OUTPATIENT)
Dept: CARDIOLOGY | Facility: CLINIC | Age: 64
End: 2024-09-17
Payer: MEDICARE

## 2024-09-17 DIAGNOSIS — I10 ESSENTIAL HYPERTENSION: ICD-10-CM

## 2024-09-17 NOTE — TELEPHONE ENCOUNTER
Patient has complaint of intermittent dyspnea.  Dyspnea can occur with rest and with activity. Recent stress wnl. Dr. Mcconnell mentioned to patient this is not uncommon with cva hx.   Coughing occurs mostly at night, nonproductive.  Considered could be secondary to Lisinopril.  To Dr. Rosangela Cancino MD

## 2024-09-19 ENCOUNTER — APPOINTMENT (OUTPATIENT)
Dept: ORTHOPEDIC SURGERY | Facility: HOSPITAL | Age: 64
End: 2024-09-19
Payer: MEDICARE

## 2024-09-19 RX ORDER — VALSARTAN 80 MG/1
80 TABLET ORAL DAILY
Qty: 90 TABLET | Refills: 3 | Status: SHIPPED | OUTPATIENT
Start: 2024-09-19 | End: 2025-09-19

## 2024-10-03 ENCOUNTER — APPOINTMENT (OUTPATIENT)
Dept: ORTHOPEDIC SURGERY | Facility: HOSPITAL | Age: 64
End: 2024-10-03
Payer: MEDICARE

## 2024-10-11 ENCOUNTER — OFFICE VISIT (OUTPATIENT)
Dept: ORTHOPEDIC SURGERY | Facility: HOSPITAL | Age: 64
End: 2024-10-11
Payer: MEDICARE

## 2024-10-11 ENCOUNTER — HOSPITAL ENCOUNTER (OUTPATIENT)
Dept: RADIOLOGY | Facility: HOSPITAL | Age: 64
Discharge: HOME | End: 2024-10-11
Payer: MEDICARE

## 2024-10-11 DIAGNOSIS — T84.84XA PAINFUL ORTHOPAEDIC HARDWARE (CMS-HCC): Primary | ICD-10-CM

## 2024-10-11 DIAGNOSIS — M25.511 RIGHT SHOULDER PAIN, UNSPECIFIED CHRONICITY: ICD-10-CM

## 2024-10-11 DIAGNOSIS — M19.011 OSTEOARTHRITIS OF RIGHT SHOULDER, UNSPECIFIED OSTEOARTHRITIS TYPE: ICD-10-CM

## 2024-10-11 PROCEDURE — 1036F TOBACCO NON-USER: CPT | Performed by: ORTHOPAEDIC SURGERY

## 2024-10-11 PROCEDURE — 73030 X-RAY EXAM OF SHOULDER: CPT | Mod: RT

## 2024-10-11 PROCEDURE — 99213 OFFICE O/P EST LOW 20 MIN: CPT | Performed by: ORTHOPAEDIC SURGERY

## 2024-10-11 PROCEDURE — 1159F MED LIST DOCD IN RCRD: CPT | Performed by: ORTHOPAEDIC SURGERY

## 2024-10-11 PROCEDURE — 1157F ADVNC CARE PLAN IN RCRD: CPT | Performed by: ORTHOPAEDIC SURGERY

## 2024-10-11 PROCEDURE — 1125F AMNT PAIN NOTED PAIN PRSNT: CPT | Performed by: ORTHOPAEDIC SURGERY

## 2024-10-11 PROCEDURE — 1160F RVW MEDS BY RX/DR IN RCRD: CPT | Performed by: ORTHOPAEDIC SURGERY

## 2024-10-11 ASSESSMENT — ENCOUNTER SYMPTOMS
WHEEZING: 0
ARTHRALGIAS: 1
SHORTNESS OF BREATH: 0
FATIGUE: 0
TROUBLE SWALLOWING: 0
CHILLS: 0
SINUS PRESSURE: 0
FEVER: 0

## 2024-10-11 ASSESSMENT — PAIN SCALES - GENERAL: PAINLEVEL_OUTOF10: 6

## 2024-10-11 ASSESSMENT — PAIN - FUNCTIONAL ASSESSMENT: PAIN_FUNCTIONAL_ASSESSMENT: 0-10

## 2024-10-11 NOTE — PROGRESS NOTES
Reason for Appointment  Chief Complaint   Patient presents with    Right Shoulder - Pain     History of Present Illness  Patient is a 64 y.o. male here today for follow-up evaluation of his right shoulder. He is over 2 years s/p a right reverse shoulder replacement. For the last few months he has had some increased pain mostly over the humeral region. He has been doing a lot of boating and turning the wheel aggrevates the shoulder. X-rays taken today are reviewed and do now show any loosening of hardware. No other changes in his PMH, allergies, or medications    History reviewed. No pertinent past medical history.    Past Surgical History:   Procedure Laterality Date    CATARACT EXTRACTION      COLECTOMY      FINGER SURGERY Right     HIP FRACTURE SURGERY Bilateral     NECK SURGERY         Medication Documentation Review Audit       Reviewed by Ana Londono PA-C (Physician Assistant) on 10/11/24 at 1555      Medication Order Taking? Sig Documenting Provider Last Dose Status   acetaminophen (Tylenol Extra Strength) 500 mg tablet [id number] Yes Take 2 tablets (1,000 mg) by mouth every 6 hours if needed for moderate pain (4 - 6). Historical Provider, MD Taking Active   ascorbic acid (Vitamin C) 500 mg chewable tablet [id number] Yes Chew 2 tablets (1,000 mg) once daily. Historical Provider, MD Taking Active   aspirin 81 mg EC tablet [id number] Yes Take 1 tablet (81 mg) by mouth once daily. Historical Provider, MD Taking Active   baclofen (Lioresal) 10 mg tablet [id number] Yes Take 1 tablet (10 mg) by mouth once daily at bedtime. Historical Provider, MD Taking Active   cholecalciferol (Vitamin D-3) 125 MCG (5000 UT) capsule [id number] Yes Take 1 capsule (125 mcg) by mouth once daily. Historical Provider, MD Taking Active   diazePAM (Valium) 5 mg tablet [id number] Yes Take 1 tablet (5 mg) by mouth once daily as needed for anxiety. Historical Provider, MD Taking Active   famotidine (Pepcid) 20 mg tablet [id number] Yes Take  by mouth. Historical Provider, MD Taking Active   ibuprofen 600 mg tablet [id number] Yes Take 1 tablet (600 mg) by mouth every 6 hours if needed for moderate pain (4 - 6). Historical Provider, MD Taking Active   magnesium glycinate 100 mg tablet [id number] Yes Take 1 tablet by mouth once daily at bedtime. Historical Provider, MD Taking Active   meloxicam (Mobic) 15 mg tablet [id number] Yes Take 1 tablet (15 mg) by mouth once daily as needed for moderate pain (4 - 6). Historical Provider, MD Taking Active   metoprolol succinate XL (Toprol-XL) 25 mg 24 hr tablet [id number] Yes Take 1 tablet (25 mg) by mouth once daily. Do not crush or chew. Historical Provider, MD Taking Active   pyridoxine (Vitamin B-6) 100 mg tablet [id number] Yes Take 2 tablets (200 mg) by mouth once daily. Historical Provider, MD Taking Active   QUEtiapine (SEROquel) 50 mg tablet [id number] Yes Take 1 tablet (50 mg) by mouth once daily at bedtime. Historical Provider, MD Taking Active   tamsulosin (Flomax) 0.4 mg 24 hr capsule [id number] Yes Take 1 capsule (0.4 mg) by mouth once daily. Historical Provider, MD Taking Active   traMADol (Ultram) 50 mg tablet [id number] Yes Take 1 tablet (50 mg) by mouth once daily as needed for severe pain (7 - 10). Historical Provider, MD Taking Active   valsartan (Diovan) 80 mg tablet [id number] Yes Take 1 tablet (80 mg) by mouth once daily. Rosangela Cancino MD Taking Active                    No Known Allergies    Review of Systems   Constitutional:  Negative for chills, fatigue and fever.   HENT:  Negative for postnasal drip, sinus pressure and trouble swallowing.    Respiratory:  Negative for shortness of breath and wheezing.    Cardiovascular:  Negative for chest pain and leg swelling.   Musculoskeletal:  Positive for arthralgias.   Skin:  Negative for pallor and rash.     Exam   On exam the right shoulder shows good active forward flexion over 140 degrees. No scapular or acromial tenderness. Mild humeral tenderness.  No crepitation with movement of the shoulder. No joint effusion. Good pulses and sensation in the upper extremity    Assessment   Encounter Diagnoses   Name Primary?    Right shoulder pain, unspecified chronicity     Painful orthopaedic hardware (CMS-HCC) Yes    Osteoarthritis of right shoulder, unspecified osteoarthritis type        Plan   He may be aggrevating the shoulder with all the repetitive motion steering his boat. We will give the shoulder a 4 week rest period to see if symptoms improve. If symptoms do not improve or worsen we will get a CT scan of the shoulder to evaluate for any hardware loosening.     Written by Ana Child saw, evaluated, and treated the patient with the PA

## 2024-10-14 ENCOUNTER — TELEPHONE (OUTPATIENT)
Dept: CARDIOLOGY | Facility: CLINIC | Age: 64
End: 2024-10-14
Payer: COMMERCIAL

## 2024-10-14 DIAGNOSIS — I10 ESSENTIAL HYPERTENSION: ICD-10-CM

## 2024-10-14 DIAGNOSIS — E78.2 MIXED HYPERLIPIDEMIA: ICD-10-CM

## 2024-10-14 NOTE — TELEPHONE ENCOUNTER
Wife phones with concerns related to patient taking losartan. She states patient was switched from lisinopril to losartan due to a cough. Patient began taking losartan 10/8. Since then he has developed a rash on the lower left side of his back. His is experiencing some itchiness on his body. She states he did not take the medication this morning and his blood pressure was 164/111, she reports she is going to have him take some of his old lisinopril instead. He is denying any other symptoms. Instructed should symptoms to continue to worsen to go to ER. She verbalized understanding.     They are inquiring on any med changes or if patient should go back to lisinopril. Please advise

## 2024-10-14 NOTE — TELEPHONE ENCOUNTER
Correction, wife states patient is on valsartan 80 mg currently and is experiencing the rash and itchiness.     Would you like for patient to switch to losartan instead?     They want to know if patient can go back on lisinopril 20 mg as they do not feel the cough is related to this and they feel that lisinopril and metoprolol best control his bp.

## 2024-10-15 RX ORDER — LISINOPRIL 20 MG/1
20 TABLET ORAL DAILY
Qty: 90 TABLET | Refills: 3 | Status: SHIPPED | OUTPATIENT
Start: 2024-10-15 | End: 2025-10-15

## 2024-10-15 NOTE — TELEPHONE ENCOUNTER
Informed patient of recommendation to go back to lisinopril 20 mg daily and to stop valsartan. They verbalized understanding.     Patient will need chem 6 in 3 weeks.    Order prepped and sent to Dr. Rosangela Cancino MD for signature.     When order is signed fax to [facility]

## 2024-11-06 ENCOUNTER — APPOINTMENT (OUTPATIENT)
Facility: HOSPITAL | Age: 64
End: 2024-11-06
Payer: MEDICARE

## 2024-11-06 ENCOUNTER — HOSPITAL ENCOUNTER (OUTPATIENT)
Dept: RADIOLOGY | Facility: CLINIC | Age: 64
Discharge: HOME | End: 2024-11-06
Payer: MEDICARE

## 2024-11-06 DIAGNOSIS — T84.84XA PAINFUL ORTHOPAEDIC HARDWARE (CMS-HCC): ICD-10-CM

## 2024-11-06 DIAGNOSIS — M19.011 OSTEOARTHRITIS OF RIGHT SHOULDER, UNSPECIFIED OSTEOARTHRITIS TYPE: ICD-10-CM

## 2024-11-06 PROCEDURE — 73200 CT UPPER EXTREMITY W/O DYE: CPT | Mod: RT

## 2024-11-20 LAB
NON-UH HIE ALANINE AMINOTRANSFERASE: 25 U/L (ref 7–52)
NON-UH HIE ALBUMIN LEVEL: 4.3 G/DL (ref 3.5–5.7)
NON-UH HIE ALBUMIN/GLOBULIN RATIO: 1.8
NON-UH HIE ALKALINE PHOSPHATASE: 76 U/L (ref 34–104)
NON-UH HIE ANION GAP: 8.6 MEQ/L (ref 6–15)
NON-UH HIE ASPARTATE AMINO TRANSFERASE: 24 U/L (ref 13–39)
NON-UH HIE BILIRUBIN,TOTAL: 0.6 MG/DL (ref 0.3–1)
NON-UH HIE BLOOD UREA NITROGEN: 15 MG/DL (ref 7–25)
NON-UH HIE CALCIUM: 9.6 MG/DL (ref 8.6–10.3)
NON-UH HIE CARBON DIOXIDE: 31.7 MMOL/L (ref 21–31)
NON-UH HIE CHLORIDE: 105 MMOL/L (ref 98–107)
NON-UH HIE CREATININE: 1.18 MG/DL (ref 0.7–1.3)
NON-UH HIE ESTIMATED GFR: >60 ML/MIN
NON-UH HIE GLOBULIN: 2.4 G/DL
NON-UH HIE GLUCOSE: 83 MG/DL (ref 70–100)
NON-UH HIE POTASSIUM: 4.3 MMOL/L (ref 3.5–5.1)
NON-UH HIE SODIUM: 141 MMOL/L (ref 136–145)
NON-UH HIE TOTAL PROTEIN: 6.7 G/DL (ref 6.4–8.9)

## 2024-11-26 ENCOUNTER — APPOINTMENT (OUTPATIENT)
Dept: CARDIOLOGY | Facility: CLINIC | Age: 64
End: 2024-11-26
Payer: MEDICARE

## 2024-11-26 VITALS
HEIGHT: 70 IN | DIASTOLIC BLOOD PRESSURE: 64 MMHG | BODY MASS INDEX: 23.91 KG/M2 | SYSTOLIC BLOOD PRESSURE: 104 MMHG | WEIGHT: 167 LBS | HEART RATE: 72 BPM

## 2024-11-26 DIAGNOSIS — Z87.891 FORMER SMOKER: ICD-10-CM

## 2024-11-26 DIAGNOSIS — Z86.73 HISTORY OF STROKE: ICD-10-CM

## 2024-11-26 DIAGNOSIS — E78.2 MIXED HYPERLIPIDEMIA: ICD-10-CM

## 2024-11-26 DIAGNOSIS — I10 ESSENTIAL HYPERTENSION: ICD-10-CM

## 2024-11-26 DIAGNOSIS — R93.1 AGATSTON CORONARY ARTERY CALCIUM SCORE LESS THAN 100: ICD-10-CM

## 2024-11-26 DIAGNOSIS — I42.1 IHSS (IDIOPATHIC HYPERTROPHIC SUBAORTIC STENOSIS) (MULTI): Primary | ICD-10-CM

## 2024-11-26 DIAGNOSIS — R06.02 SHORTNESS OF BREATH: ICD-10-CM

## 2024-11-26 PROCEDURE — 3008F BODY MASS INDEX DOCD: CPT | Performed by: INTERNAL MEDICINE

## 2024-11-26 PROCEDURE — 1159F MED LIST DOCD IN RCRD: CPT | Performed by: INTERNAL MEDICINE

## 2024-11-26 PROCEDURE — 99214 OFFICE O/P EST MOD 30 MIN: CPT | Performed by: INTERNAL MEDICINE

## 2024-11-26 PROCEDURE — 3078F DIAST BP <80 MM HG: CPT | Performed by: INTERNAL MEDICINE

## 2024-11-26 PROCEDURE — 1036F TOBACCO NON-USER: CPT | Performed by: INTERNAL MEDICINE

## 2024-11-26 PROCEDURE — 1157F ADVNC CARE PLAN IN RCRD: CPT | Performed by: INTERNAL MEDICINE

## 2024-11-26 PROCEDURE — 3074F SYST BP LT 130 MM HG: CPT | Performed by: INTERNAL MEDICINE

## 2024-11-26 RX ORDER — DOCUSATE SODIUM 100 MG/1
100 CAPSULE, LIQUID FILLED ORAL 2 TIMES DAILY PRN
COMMUNITY
Start: 2024-04-26

## 2024-11-26 RX ORDER — ATORVASTATIN CALCIUM 40 MG/1
1 TABLET, FILM COATED ORAL EVERY EVENING
COMMUNITY
Start: 2024-05-08

## 2024-11-26 RX ORDER — MEMANTINE HYDROCHLORIDE 5 MG/1
5 TABLET ORAL 2 TIMES DAILY
COMMUNITY
Start: 2024-10-24 | End: 2025-10-24

## 2024-11-26 RX ORDER — LEVOTHYROXINE SODIUM 50 UG/1
50 TABLET ORAL DAILY
COMMUNITY
Start: 2024-11-06

## 2024-11-26 ASSESSMENT — ENCOUNTER SYMPTOMS: SHORTNESS OF BREATH: 1

## 2024-11-26 NOTE — PROGRESS NOTES
Subjective   Bryan Hackett is a 64 y.o. male       Chief Complaint    Follow-up          HPI   Patient is in the office for follow-up for the problems noted below.  He came with his wife.  Since his last visit back in May 2024 he has had no cardiovascular events but recently had episode of mild hypotension requiring cutting back on the lisinopril.  He continued to have symptoms of shortness of breath with exertion that is not consistent.  He denies any weight gain orthopnea PND or lower extremity edema no chest pain no palpitations.  He has been following with neurology who explained to him that his dyspnea may be related to the previous stroke he had several months ago.  I could not hear any cardiac murmur on examination today.  His blood pressure is normal cardiac and pulmonary examinations were normal.  Recent lab data were reviewed and there was no areas of concern noted.    Assessment/recommendations:     1-dynamic LVOT obstruction while tachycardic and hypertensive in the hospital with sigmoid basal septum back in March 2024..  Resting gradient was up to 200 mmHg.  Beta-blocker therapy and hypertension control led to complete resolution of the problem.  Currently has no indication of outflow obstruction with no cardiac murmurs.  He was advised to continue beta-blocker therapy.  Due to his symptoms of recurrent dyspnea on exertion repeat echocardiogram is scheduled  2-essential hypertension, currently his pressure is under control with no changes needed  3-elevated coronary calcium score at 73.  Nuclear stress test 2024 was normal, continue aspirin and statin  4-small stroke on brain MRI March 2024 with no clinical symptoms.  No recurrences and will continue aspirin and statin.  Review of Systems   Respiratory:  Positive for shortness of breath.    All other systems reviewed and are negative.           Vitals:    11/26/24 1550   BP: 104/64   BP Location: Left arm   Patient Position: Sitting   Pulse: 72  "  Weight: 75.8 kg (167 lb)   Height: 1.778 m (5' 10\")        Objective   Physical Exam  Constitutional:       Appearance: Normal appearance.   HENT:      Nose: Nose normal.   Neck:      Vascular: No carotid bruit.   Cardiovascular:      Rate and Rhythm: Normal rate.      Pulses: Normal pulses.      Heart sounds: Normal heart sounds.   Pulmonary:      Effort: Pulmonary effort is normal.   Abdominal:      General: Bowel sounds are normal.      Palpations: Abdomen is soft.   Musculoskeletal:         General: Normal range of motion.      Cervical back: Normal range of motion.      Right lower leg: No edema.      Left lower leg: No edema.   Skin:     General: Skin is warm and dry.   Neurological:      General: No focal deficit present.      Mental Status: He is alert.   Psychiatric:         Mood and Affect: Mood normal.         Behavior: Behavior normal.         Thought Content: Thought content normal.         Judgment: Judgment normal.         Allergies  Patient has no known allergies.     Current Medications    Current Outpatient Medications:     acetaminophen (Tylenol Extra Strength) 500 mg tablet, Take 2 tablets (1,000 mg) by mouth every 6 hours if needed for moderate pain (4 - 6)., Disp: , Rfl:     ascorbic acid (Vitamin C) 500 mg chewable tablet, Chew 2 tablets (1,000 mg) once daily., Disp: , Rfl:     aspirin 81 mg EC tablet, Take 1 tablet (81 mg) by mouth once daily., Disp: , Rfl:     atorvastatin (Lipitor) 40 mg tablet, Take 1 tablet (40 mg) by mouth once daily in the evening., Disp: , Rfl:     cholecalciferol (Vitamin D-3) 125 MCG (5000 UT) capsule, Take 1 capsule (125 mcg) by mouth once daily., Disp: , Rfl:     DAILY MULTI-VITAMIN ORAL, Take 1 tablet by mouth once daily., Disp: , Rfl:     diazePAM (Valium) 5 mg tablet, Take 1 tablet (5 mg) by mouth once daily as needed for anxiety., Disp: , Rfl:     docusate sodium (Colace) 100 mg capsule, Take 1 capsule (100 mg) by mouth 2 times a day as needed., Disp: , Rfl: "     ibuprofen 600 mg tablet, Take 1 tablet (600 mg) by mouth every 6 hours if needed for moderate pain (4 - 6)., Disp: , Rfl:     levothyroxine (Synthroid, Levoxyl) 50 mcg tablet, Take 1 tablet (50 mcg) by mouth early in the morning.., Disp: , Rfl:     lisinopril 20 mg tablet, Take 1 tablet (20 mg) by mouth once daily., Disp: 90 tablet, Rfl: 3    magnesium glycinate 100 mg tablet, Take 1 tablet by mouth once daily at bedtime., Disp: , Rfl:     memantine (Namenda) 5 mg tablet, Take 1 tablet (5 mg) by mouth twice a day., Disp: , Rfl:     metoprolol succinate XL (Toprol-XL) 25 mg 24 hr tablet, Take 1 tablet (25 mg) by mouth once daily. Do not crush or chew., Disp: , Rfl:     QUEtiapine (SEROquel) 50 mg tablet, Take 1 tablet (50 mg) by mouth once daily at bedtime., Disp: , Rfl:     tamsulosin (Flomax) 0.4 mg 24 hr capsule, Take 1 capsule (0.4 mg) by mouth once daily., Disp: , Rfl:     traMADol (Ultram) 50 mg tablet, Take 1 tablet (50 mg) by mouth once daily as needed for severe pain (7 - 10)., Disp: , Rfl:                      Assessment/Plan   1. IHSS (idiopathic hypertrophic subaortic stenosis) (Multi)        2. Shortness of breath  Transthoracic Echo Complete      3. Agatston coronary artery calcium score less than 100        4. Essential hypertension  Follow Up In Cardiology    Follow Up In Cardiology      5. History of stroke        6. Mixed hyperlipidemia        7. Former smoker        8. BMI 23.0-23.9, adult                 Scribe Attestation  By signing my name below, IConcha LPN   , Scribjc   attest that this documentation has been prepared under the direction and in the presence of Rosangela Cancino MD.     Provider Attestation - Scribe documentation    All medical record entries made by the Scribe were at my direction and personally dictated by me. I have reviewed the chart and agree that the record accurately reflects my personal performance of the history, physical exam, discussion and plan.

## 2024-11-26 NOTE — LETTER
November 26, 2024     Del Zuniga DO  [address]  [place] [address]    Patient: Bryan Hackett   YOB: 1960   Date of Visit: 11/26/2024       Dear Dr. Del Zuniga DO:    Thank you for referring Bryan Hackett to me for evaluation. Below are my notes for this consultation.  If you have questions, please do not hesitate to call me. I look forward to following your patient along with you.       Sincerely,     Rosangela Cancino MD      CC: No Recipients  ______________________________________________________________________________________    Subjective   Bryan Hackett is a 64 y.o. male       Chief Complaint    Follow-up          HPI   Patient is in the office for follow-up for the problems noted below.  He came with his wife.  Since his last visit back in May 2024 he has had no cardiovascular events but recently had episode of mild hypotension requiring cutting back on the lisinopril.  He continued to have symptoms of shortness of breath with exertion that is not consistent.  He denies any weight gain orthopnea PND or lower extremity edema no chest pain no palpitations.  He has been following with neurology who explained to him that his dyspnea may be related to the previous stroke he had several months ago.  I could not hear any cardiac murmur on examination today.  His blood pressure is normal cardiac and pulmonary examinations were normal.  Recent lab data were reviewed and there was no areas of concern noted.    Assessment/recommendations:     1-dynamic LVOT obstruction while tachycardic and hypertensive in the hospital with sigmoid basal septum back in March 2024..  Resting gradient was up to 200 mmHg.  Beta-blocker therapy and hypertension control led to complete resolution of the problem.  Currently has no indication of outflow obstruction with no cardiac murmurs.  He was advised to continue beta-blocker therapy.  Due to his symptoms of recurrent dyspnea on exertion repeat  "echocardiogram is scheduled  2-essential hypertension, currently his pressure is under control with no changes needed  3-elevated coronary calcium score at 73.  Nuclear stress test 2024 was normal, continue aspirin and statin  4-small stroke on brain MRI March 2024 with no clinical symptoms.  No recurrences and will continue aspirin and statin.  Review of Systems   Respiratory:  Positive for shortness of breath.    All other systems reviewed and are negative.           Vitals:    11/26/24 1550   BP: 104/64   BP Location: Left arm   Patient Position: Sitting   Pulse: 72   Weight: 75.8 kg (167 lb)   Height: 1.778 m (5' 10\")        Objective   Physical Exam  Constitutional:       Appearance: Normal appearance.   HENT:      Nose: Nose normal.   Neck:      Vascular: No carotid bruit.   Cardiovascular:      Rate and Rhythm: Normal rate.      Pulses: Normal pulses.      Heart sounds: Normal heart sounds.   Pulmonary:      Effort: Pulmonary effort is normal.   Abdominal:      General: Bowel sounds are normal.      Palpations: Abdomen is soft.   Musculoskeletal:         General: Normal range of motion.      Cervical back: Normal range of motion.      Right lower leg: No edema.      Left lower leg: No edema.   Skin:     General: Skin is warm and dry.   Neurological:      General: No focal deficit present.      Mental Status: He is alert.   Psychiatric:         Mood and Affect: Mood normal.         Behavior: Behavior normal.         Thought Content: Thought content normal.         Judgment: Judgment normal.         Allergies  Patient has no known allergies.     Current Medications    Current Outpatient Medications:   •  acetaminophen (Tylenol Extra Strength) 500 mg tablet, Take 2 tablets (1,000 mg) by mouth every 6 hours if needed for moderate pain (4 - 6)., Disp: , Rfl:   •  ascorbic acid (Vitamin C) 500 mg chewable tablet, Chew 2 tablets (1,000 mg) once daily., Disp: , Rfl:   •  aspirin 81 mg EC tablet, Take 1 tablet (81 mg) " by mouth once daily., Disp: , Rfl:   •  atorvastatin (Lipitor) 40 mg tablet, Take 1 tablet (40 mg) by mouth once daily in the evening., Disp: , Rfl:   •  cholecalciferol (Vitamin D-3) 125 MCG (5000 UT) capsule, Take 1 capsule (125 mcg) by mouth once daily., Disp: , Rfl:   •  DAILY MULTI-VITAMIN ORAL, Take 1 tablet by mouth once daily., Disp: , Rfl:   •  diazePAM (Valium) 5 mg tablet, Take 1 tablet (5 mg) by mouth once daily as needed for anxiety., Disp: , Rfl:   •  docusate sodium (Colace) 100 mg capsule, Take 1 capsule (100 mg) by mouth 2 times a day as needed., Disp: , Rfl:   •  ibuprofen 600 mg tablet, Take 1 tablet (600 mg) by mouth every 6 hours if needed for moderate pain (4 - 6)., Disp: , Rfl:   •  levothyroxine (Synthroid, Levoxyl) 50 mcg tablet, Take 1 tablet (50 mcg) by mouth early in the morning.., Disp: , Rfl:   •  lisinopril 20 mg tablet, Take 1 tablet (20 mg) by mouth once daily., Disp: 90 tablet, Rfl: 3  •  magnesium glycinate 100 mg tablet, Take 1 tablet by mouth once daily at bedtime., Disp: , Rfl:   •  memantine (Namenda) 5 mg tablet, Take 1 tablet (5 mg) by mouth twice a day., Disp: , Rfl:   •  metoprolol succinate XL (Toprol-XL) 25 mg 24 hr tablet, Take 1 tablet (25 mg) by mouth once daily. Do not crush or chew., Disp: , Rfl:   •  QUEtiapine (SEROquel) 50 mg tablet, Take 1 tablet (50 mg) by mouth once daily at bedtime., Disp: , Rfl:   •  tamsulosin (Flomax) 0.4 mg 24 hr capsule, Take 1 capsule (0.4 mg) by mouth once daily., Disp: , Rfl:   •  traMADol (Ultram) 50 mg tablet, Take 1 tablet (50 mg) by mouth once daily as needed for severe pain (7 - 10)., Disp: , Rfl:                      Assessment/Plan   1. IHSS (idiopathic hypertrophic subaortic stenosis) (Multi)        2. Shortness of breath  Transthoracic Echo Complete      3. Agatston coronary artery calcium score less than 100        4. Essential hypertension  Follow Up In Cardiology    Follow Up In Cardiology      5. History of stroke         6. Mixed hyperlipidemia        7. Former smoker        8. BMI 23.0-23.9, adult                 Scribe Attestation  By signing my name below, I, Concha CURTIS LPN   , Scribjc   attest that this documentation has been prepared under the direction and in the presence of Rosangela Cancino MD.     Provider Attestation - Scribe documentation    All medical record entries made by the Scribe were at my direction and personally dictated by me. I have reviewed the chart and agree that the record accurately reflects my personal performance of the history, physical exam, discussion and plan.

## 2024-11-26 NOTE — PATIENT INSTRUCTIONS
Please bring all medicines, vitamins, and herbal supplements with you when you come to the office.    Prescriptions will not be filled unless you are compliant with your follow up appointments or have a follow up appointment scheduled as per instruction of your physician. Refills should be requested at the time of your visit.     Echo  Follow up

## 2024-12-02 ENCOUNTER — HOSPITAL ENCOUNTER (OUTPATIENT)
Dept: CARDIOLOGY | Facility: CLINIC | Age: 64
Discharge: HOME | End: 2024-12-02
Payer: MEDICARE

## 2024-12-02 VITALS
HEIGHT: 70 IN | DIASTOLIC BLOOD PRESSURE: 60 MMHG | SYSTOLIC BLOOD PRESSURE: 106 MMHG | BODY MASS INDEX: 23.91 KG/M2 | WEIGHT: 167 LBS

## 2024-12-02 DIAGNOSIS — R06.02 SHORTNESS OF BREATH: ICD-10-CM

## 2024-12-02 LAB
AORTIC VALVE MEAN GRADIENT: 4 MMHG
AORTIC VALVE PEAK VELOCITY: 1.4 M/S
AV PEAK GRADIENT: 8 MMHG
AVA (PEAK VEL): 2.51 CM2
AVA (VTI): 2.73 CM2
EJECTION FRACTION APICAL 4 CHAMBER: 57.4
EJECTION FRACTION: 63 %
LEFT VENTRICLE INTERNAL DIMENSION DIASTOLE: 3.5 CM (ref 3.5–6)
LEFT VENTRICULAR OUTFLOW TRACT DIAMETER: 2.2 CM
MITRAL VALVE E/A RATIO: 0.6
MITRAL VALVE E/E' RATIO: 7.6
RIGHT VENTRICLE PEAK SYSTOLIC PRESSURE: 23.8 MMHG

## 2024-12-02 PROCEDURE — 93306 TTE W/DOPPLER COMPLETE: CPT

## 2024-12-02 PROCEDURE — 93306 TTE W/DOPPLER COMPLETE: CPT | Performed by: INTERNAL MEDICINE

## 2024-12-03 ENCOUNTER — APPOINTMENT (OUTPATIENT)
Dept: CARDIOLOGY | Facility: CLINIC | Age: 64
End: 2024-12-03
Payer: MEDICARE

## 2024-12-04 ENCOUNTER — APPOINTMENT (OUTPATIENT)
Dept: CARDIOLOGY | Facility: CLINIC | Age: 64
End: 2024-12-04
Payer: MEDICARE

## 2024-12-04 ENCOUNTER — TELEPHONE (OUTPATIENT)
Dept: CARDIOLOGY | Facility: CLINIC | Age: 64
End: 2024-12-04

## 2024-12-04 NOTE — TELEPHONE ENCOUNTER
----- Message from Rosangela Cancino sent at 12/3/2024  2:04 PM EST -----  Let him know his heart function is normal and there is no obstruction noted in the outflow tract which is what I expected  ----- Message -----  From: Interface, [facility] - Cardiology Results In  Sent: 12/2/2024   5:36 PM EST  To: Rosangela Cancino MD

## 2024-12-04 NOTE — TELEPHONE ENCOUNTER
Result Communication    Resulted Orders   Transthoracic Echo Complete   Result Value Ref Range    AV mn grad 4 mmHg    AV pk terrance 1.40 m/s    LVOT diam 2.20 cm    MV E/A ratio 0.60     MV avg E/e' ratio 7.60     LV EF 63 %    RVSP 23.8 mmHg    LVIDd 3.50 cm    Aortic Valve Area by Continuity of Peak Velocity 2.51 cm2    AV pk grad 8 mmHg    Aortic Valve Area by Continuity of VTI 2.73 cm2    LV A4C EF 57.4     Narrative                   [address], [address], [place] [address]          Tel [phone] Fax [phone]    TRANSTHORACIC ECHOCARDIOGRAM REPORT    Patient Name:        GORDY ATKINS        Reading Physician:    [id number] Elyse Cancino MD,                                                                  [facility]  Study Date:          12/2/2024            Ordering Provider:    [id number] ELYSE CANCINO  MRN/PID:             [id number]             Fellow:  Accession#:          [id number]         Nurse:  Date of Birth/Age:   1960 / 64 years Sonographer:          Malina Masters RDCS, RVT  Gender Assigned at                       Additional Staff:  Birth:  Height:              177.80 cm            Admit Date:  Weight:              75.75 kg             Admission Status:  BSA / BMI:           1.93 m2 / 23.96      Department Location:  [facility]                       kg/m2                                      [facility]  Blood Pressure: 106 /60 mmHg    Study Type:    TRANSTHORACIC ECHO (TTE) COMPLETE  Diagnosis/ICD: Shortness of breath-R06.02  Indication:    HTN, Hyperlipidemia, Former Smoker, Dynamic LVOT Obstruction,                 Elevated Coronary Calcium Score, CVA-3/2024  CPT Codes:     Echo Complete w Full [id number]   Study Detail: The following Echo studies were performed:  2D, M-Mode, Doppler and                color flow.       PHYSICIAN INTERPRETATION:  Left Ventricle: Left ventricular ejection fraction is normal, by visual estimate at 60-65%. There are no regional wall motion abnormalities. The left ventricular cavity size is normal. There is severely increased septal and mildly increased posterior left ventricular wall thickness. There is left ventricular concentric remodeling. Spectral Doppler shows a Grade I (impaired relaxation pattern) of left ventricular diastolic filling with normal left atrial filling pressure. Asymmetrical basal septal hypertrophy without LVOT obstruction at rest or with Valsalva maneuver.  Left Atrium: The left atrium is normal in size.  Right Ventricle: The right ventricle is normal in size. There is normal right ventricular global systolic function.  Right Atrium: The right atrium is normal in size.  Aortic Valve: The aortic valve is trileaflet. The aortic valve dimensionless index is 0.72. There is trace to mild aortic valve regurgitation. The peak instantaneous gradient of the aortic valve is 8 mmHg. The mean gradient of the aortic valve is 4 mmHg.  Mitral Valve: The mitral valve is normal in structure. The peak instantaneous gradient of the mitral valve is 4 mmHg. There is no evidence of mitral valve regurgitation.  Tricuspid Valve: The tricuspid valve is structurally normal. No evidence of tricuspid regurgitation.  Pulmonic Valve: The pulmonic valve is not well visualized. There is no indication of pulmonic valve regurgitation.  Pericardium: No pericardial effusion noted.  Aorta: The aortic root is normal.  Pulmonary Artery: The Doppler estimated pulmonary artery diastolic pressure is 10.6 mmHg.  Systemic Veins: The inferior vena cava appears normal in size.  In comparison to the previous echocardiogram(s): When compared to a study from 4/2/2024 at another institution the LVOT resting gradient of 210 mmHg has resolved, the systolic anterior motion  of the mitral valve reported previously is no longer seen, mild pulmonary hypertension has resolved, mild aortic regurgitation seen on present study is new.       CONCLUSIONS:   1. Left ventricular ejection fraction is normal, by visual estimate at 60-65%.   2. Spectral Doppler shows a Grade I (impaired relaxation pattern) of left ventricular diastolic filling with normal left atrial filling pressure.   3. Asymmetrical basal septal hypertrophy without LVOT obstruction at rest or with Valsalva maneuver.   4. There is normal right ventricular global systolic function.   5. When compared to a study from 4/2/2024 at another institution the LVOT resting gradient of 210 mmHg has resolved, the systolic anterior motion of the mitral valve reported previously is no longer seen, mild pulmonary hypertension has resolved, mild aortic regurgitation seen on present study is new.   6. There is severely increased septal and mildly increased posterior left ventricular wall thickness.    QUANTITATIVE DATA SUMMARY:     2D MEASUREMENTS:           Normal Ranges:  Ao Root d:       3.20 cm   (2.0-3.7cm)  LAs:             3.40 cm   (2.7-4.0cm)  RVIDd:           2.47 cm   (0.9-3.6cm)  IVSd:            1.74 cm   (0.6-1.1cm)  LVPWd:           1.15 cm   (0.6-1.1cm)  LVIDd:           3.50 cm   (3.9-5.9cm)  LVIDs:           2.02 cm  LV Mass Index:   94.1 g/m2  LV % FS          42.3 %       LV SYSTOLIC FUNCTION BY 2D PLANIMETRY (MOD):                       Normal Ranges:  EF-A4C View:    57 % (>=55%)  EF-Visual:      63 %  LV EF Reported: 63 %       LV DIASTOLIC FUNCTION:           Normal Ranges:  MV Peak E:             0.49 m/s  (0.7-1.2 m/s)  MV Peak A:             0.81 m/s  (0.42-0.7 m/s)  E/A Ratio:             0.60      (1.0-2.2)  MV e'                  0.056 m/s (>8.0)  MV lateral e'          0.06 m/s  MV medial e'           0.05 m/s  E/e' Ratio:            8.65      (<8.0)       MITRAL VALVE:          Normal Ranges:  MV Vmax:      0.99 m/s  (<=1.3m/s)  MV peak PG:   3.9 mmHg (<5mmHg)  MV mean P.0 mmHg (<48mmHg)       AORTIC VALVE:                     Normal Ranges:  AoV Vmax:                1.40 m/s (<=1.7m/s)  AoV Peak P.8 mmHg (<20mmHg)  AoV Mean P.0 mmHg (1.7-11.5mmHg)  LVOT Max Eugene:            0.92 m/s (<=1.1m/s)  AoV VTI:                 25.20 cm (18-25cm)  LVOT VTI:                18.10 cm  LVOT Diameter:           2.20 cm  (1.8-2.4cm)  AoV Area, VTI:           2.73 cm2 (2.5-5.5cm2)  AoV Area,Vmax:           2.51 cm2 (2.5-4.5cm2)  AoV Dimensionless Index: 0.72       AORTIC INSUFFICIENCY:  AI Vmax:       4.52 m/s  AI Half-time:  614 msec  AI Decel Rate: 216.00 cm/s2       TRICUSPID VALVE/RVSP:          Normal Ranges:  Peak TR Velocity:     2.28 m/s  RV Syst Pressure:     24 mmHg  (< 30mmHg)       PULMONIC VALVE:           Normal Ranges:  PV Max Eugene:     0.6 m/s   (0.6-0.9m/s)  PV Max P.3 mmHg  PIEDV:          1.38 m/s  PADP:           10.6 mmHg       [id number] Rosangela Cancino MD, [facility]  Electronically signed on 2024 at 5:36:14 PM         ** Final **         10:18 AM      Results were successfully communicated with the patient and they acknowledged their understanding.

## 2024-12-09 ENCOUNTER — APPOINTMENT (OUTPATIENT)
Dept: RADIOLOGY | Facility: CLINIC | Age: 64
End: 2024-12-09
Payer: MEDICARE

## 2025-07-02 ENCOUNTER — APPOINTMENT (OUTPATIENT)
Dept: CARDIOLOGY | Facility: CLINIC | Age: 65
End: 2025-07-02
Payer: MEDICARE

## 2025-07-02 VITALS
HEIGHT: 70 IN | SYSTOLIC BLOOD PRESSURE: 110 MMHG | DIASTOLIC BLOOD PRESSURE: 78 MMHG | BODY MASS INDEX: 22.5 KG/M2 | HEART RATE: 96 BPM | WEIGHT: 157.2 LBS

## 2025-07-02 DIAGNOSIS — R93.1 AGATSTON CORONARY ARTERY CALCIUM SCORE LESS THAN 100: ICD-10-CM

## 2025-07-02 DIAGNOSIS — I10 ESSENTIAL HYPERTENSION: ICD-10-CM

## 2025-07-02 DIAGNOSIS — R06.02 SHORTNESS OF BREATH: ICD-10-CM

## 2025-07-02 DIAGNOSIS — Z86.73 HISTORY OF STROKE: ICD-10-CM

## 2025-07-02 DIAGNOSIS — Q24.8 LEFT VENTRICULAR OUTFLOW OBSTRUCTION (HHS-HCC): Primary | ICD-10-CM

## 2025-07-02 DIAGNOSIS — Z87.891 FORMER SMOKER: ICD-10-CM

## 2025-07-02 DIAGNOSIS — E78.2 MIXED HYPERLIPIDEMIA: ICD-10-CM

## 2025-07-02 PROCEDURE — 3008F BODY MASS INDEX DOCD: CPT | Performed by: INTERNAL MEDICINE

## 2025-07-02 PROCEDURE — G2211 COMPLEX E/M VISIT ADD ON: HCPCS | Performed by: INTERNAL MEDICINE

## 2025-07-02 PROCEDURE — 3078F DIAST BP <80 MM HG: CPT | Performed by: INTERNAL MEDICINE

## 2025-07-02 PROCEDURE — 1036F TOBACCO NON-USER: CPT | Performed by: INTERNAL MEDICINE

## 2025-07-02 PROCEDURE — 1159F MED LIST DOCD IN RCRD: CPT | Performed by: INTERNAL MEDICINE

## 2025-07-02 PROCEDURE — 99214 OFFICE O/P EST MOD 30 MIN: CPT | Performed by: INTERNAL MEDICINE

## 2025-07-02 PROCEDURE — 1157F ADVNC CARE PLAN IN RCRD: CPT | Performed by: INTERNAL MEDICINE

## 2025-07-02 PROCEDURE — 3074F SYST BP LT 130 MM HG: CPT | Performed by: INTERNAL MEDICINE

## 2025-07-02 RX ORDER — LISINOPRIL 20 MG/1
20 TABLET ORAL DAILY
Qty: 90 TABLET | Refills: 3 | Status: SHIPPED | OUTPATIENT
Start: 2025-07-02 | End: 2026-07-02

## 2025-07-02 RX ORDER — ATORVASTATIN CALCIUM 40 MG/1
40 TABLET, FILM COATED ORAL EVERY EVENING
Qty: 90 TABLET | Refills: 3 | Status: SHIPPED | OUTPATIENT
Start: 2025-07-02 | End: 2026-07-02

## 2025-07-02 RX ORDER — METOPROLOL SUCCINATE 25 MG/1
25 TABLET, EXTENDED RELEASE ORAL DAILY
Qty: 90 TABLET | Refills: 3 | Status: SHIPPED | OUTPATIENT
Start: 2025-07-02 | End: 2026-07-02

## 2025-07-02 NOTE — PROGRESS NOTES
HPI  Patient is in the office with his wife for follow-up for history of dyspnea and previous dynamic LVOT gradient leading to loud cardiac murmur.  This has resolved on beta-blocker therapy and his echocardiogram that was done in December 2024 at his last visit revealed normal ejection fraction and bulging septum without LVOT obstruction at rest or post Valsalva maneuver.  His weight is 10 pounds below his last visit and he was advised to maintain his weight around 160 pounds.  He maintains active lifestyle walking with his wife regularly.  Reports no palpitations and no recurrent TIAs or strokes.  He continue to follow with neurology.  He is scheduled in the future for PFT I expected to come back normal.  He is non-smoker nondiabetic and his hypertension is under control.  He reports no angina no orthopnea PND lower extremity edema.  He has not had any blood work done recently and we ordered blood work to be done in the near future.    Assessment/recommendations:     1-dynamic LVOT obstruction while tachycardic and hypertensive in the hospital with sigmoid basal septum back in March 2024..  Resting gradient was up to 200 mmHg.  Beta-blocker therapy and hypertension control led to complete resolution of the problem.  Currently has no indication of outflow obstruction with no cardiac murmurs.  Last echocardiogram findings December 2024 was shared with the patient and his wife.  2-essential hypertension, currently his pressure is under control on metoprolol and lisinopril, he follows low-salt diet, achieved ideal body weight and maintains active lifestyle and has no sleep apnea, no changes needed  3-elevated coronary calcium score at 73.  Nuclear stress test 2024 was normal, continue aspirin and statin, remains asymptomatic  4-small stroke on brain MRI March 2024 with no clinical symptoms.  No recurrences and will continue aspirin and statin.  Continue to follow with neurology.  ROS   Review of system essentially  "normal except for occasional dyspnea      Vitals:    07/02/25 1037   BP: 110/78   BP Location: Right arm   Patient Position: Sitting   Pulse: 96   Weight: 71.3 kg (157 lb 3.2 oz)   Height: 1.778 m (5' 10\")        Objective   Physical Exam  Constitutional:       Appearance: Normal appearance.   HENT:      Nose: Nose normal.   Neck:      Vascular: No carotid bruit.   Cardiovascular:      Rate and Rhythm: Normal rate.      Pulses: Normal pulses.      Heart sounds: Normal heart sounds.   Pulmonary:      Effort: Pulmonary effort is normal.   Abdominal:      General: Bowel sounds are normal.      Palpations: Abdomen is soft.   Musculoskeletal:         General: Normal range of motion.      Cervical back: Normal range of motion.      Right lower leg: No edema.      Left lower leg: No edema.   Skin:     General: Skin is warm and dry.   Neurological:      General: No focal deficit present.      Mental Status: He is alert.   Psychiatric:         Mood and Affect: Mood normal.         Behavior: Behavior normal.         Thought Content: Thought content normal.         Judgment: Judgment normal.         Allergies  Patient has no known allergies.     Current Medications  Current Outpatient Medications   Medication Instructions    acetaminophen (TYLENOL EXTRA STRENGTH) 1,000 mg, Every 6 hours PRN    ascorbic acid (VITAMIN C) 1,000 mg, Daily    aspirin 81 mg, Daily    atorvastatin (LIPITOR) 40 mg, oral, Every evening    cholecalciferol (VITAMIN D-3) 125 mcg, Daily    DAILY MULTI-VITAMIN ORAL 1 tablet, Daily    diazePAM (VALIUM) 5 mg, Daily PRN    docusate sodium (COLACE) 100 mg, 2 times daily PRN    ibuprofen 600 mg, Every 6 hours PRN    levothyroxine (SYNTHROID, LEVOXYL) 50 mcg, Daily    lisinopril 20 mg, oral, Daily    magnesium glycinate 100 mg tablet 1 tablet, Nightly    memantine (NAMENDA) 5 mg, 2 times daily    metoprolol succinate XL (TOPROL-XL) 25 mg, oral, Daily, Do not crush or chew.    QUEtiapine (SEROQUEL) 50 mg, Nightly "    tamsulosin (FLOMAX) 0.4 mg, Daily    traMADol (ULTRAM) 50 mg, Daily PRN                        Assessment/Plan   1. Essential hypertension  Follow Up In Cardiology    Follow Up In Cardiology    metoprolol succinate XL (Toprol-XL) 25 mg 24 hr tablet    lisinopril 20 mg tablet      2. Mixed hyperlipidemia  atorvastatin (Lipitor) 40 mg tablet      3. Shortness of breath        4. Agatston coronary artery calcium score less than 100        5. BMI 22.0-22.9, adult        6. Former smoker                 Scribe Attestation  By signing my name below, IConcha LPN   , Scribe   attest that this documentation has been prepared under the direction and in the presence of Rosangela Cancino MD.     Provider Attestation - Scribe documentation    All medical record entries made by the Scribe were at my direction and personally dictated by me. I have reviewed the chart and agree that the record accurately reflects my personal performance of the history, physical exam, discussion and plan.

## 2025-07-02 NOTE — LETTER
July 2, 2025     Del Zuniga DO  [address]  [place] [address]    Patient: Bryan Hackett   YOB: 1960   Date of Visit: 7/2/2025       Dear Dr. Del Zuniga DO:    Thank you for referring Bryan Hackett to me for evaluation. Below are my notes for this consultation.  If you have questions, please do not hesitate to call me. I look forward to following your patient along with you.       Sincerely,     Rosangela Cancino MD      CC: No Recipients  ______________________________________________________________________________________    HPI  Patient is in the office with his wife for follow-up for history of dyspnea and previous dynamic LVOT gradient leading to loud cardiac murmur.  This has resolved on beta-blocker therapy and his echocardiogram that was done in December 2024 at his last visit revealed normal ejection fraction and bulging septum without LVOT obstruction at rest or post Valsalva maneuver.  His weight is 10 pounds below his last visit and he was advised to maintain his weight around 160 pounds.  He maintains active lifestyle walking with his wife regularly.  Reports no palpitations and no recurrent TIAs or strokes.  He continue to follow with neurology.  He is scheduled in the future for PFT I expected to come back normal.  He is non-smoker nondiabetic and his hypertension is under control.  He reports no angina no orthopnea PND lower extremity edema.  He has not had any blood work done recently and we ordered blood work to be done in the near future.    Assessment/recommendations:     1-dynamic LVOT obstruction while tachycardic and hypertensive in the hospital with sigmoid basal septum back in March 2024..  Resting gradient was up to 200 mmHg.  Beta-blocker therapy and hypertension control led to complete resolution of the problem.  Currently has no indication of outflow obstruction with no cardiac murmurs.  Last echocardiogram findings December 2024 was shared with  "the patient and his wife.  2-essential hypertension, currently his pressure is under control on metoprolol and lisinopril, he follows low-salt diet, achieved ideal body weight and maintains active lifestyle and has no sleep apnea, no changes needed  3-elevated coronary calcium score at 73.  Nuclear stress test 2024 was normal, continue aspirin and statin, remains asymptomatic  4-small stroke on brain MRI March 2024 with no clinical symptoms.  No recurrences and will continue aspirin and statin.  Continue to follow with neurology.  ROS   Review of system essentially normal except for occasional dyspnea      Vitals:    07/02/25 1037   BP: 110/78   BP Location: Right arm   Patient Position: Sitting   Pulse: 96   Weight: 71.3 kg (157 lb 3.2 oz)   Height: 1.778 m (5' 10\")        Objective   Physical Exam  Constitutional:       Appearance: Normal appearance.   HENT:      Nose: Nose normal.   Neck:      Vascular: No carotid bruit.   Cardiovascular:      Rate and Rhythm: Normal rate.      Pulses: Normal pulses.      Heart sounds: Normal heart sounds.   Pulmonary:      Effort: Pulmonary effort is normal.   Abdominal:      General: Bowel sounds are normal.      Palpations: Abdomen is soft.   Musculoskeletal:         General: Normal range of motion.      Cervical back: Normal range of motion.      Right lower leg: No edema.      Left lower leg: No edema.   Skin:     General: Skin is warm and dry.   Neurological:      General: No focal deficit present.      Mental Status: He is alert.   Psychiatric:         Mood and Affect: Mood normal.         Behavior: Behavior normal.         Thought Content: Thought content normal.         Judgment: Judgment normal.         Allergies  Patient has no known allergies.     Current Medications  Current Outpatient Medications   Medication Instructions   • acetaminophen (TYLENOL EXTRA STRENGTH) 1,000 mg, Every 6 hours PRN   • ascorbic acid (VITAMIN C) 1,000 mg, Daily   • aspirin 81 mg, Daily   • " atorvastatin (LIPITOR) 40 mg, oral, Every evening   • cholecalciferol (VITAMIN D-3) 125 mcg, Daily   • DAILY MULTI-VITAMIN ORAL 1 tablet, Daily   • diazePAM (VALIUM) 5 mg, Daily PRN   • docusate sodium (COLACE) 100 mg, 2 times daily PRN   • ibuprofen 600 mg, Every 6 hours PRN   • levothyroxine (SYNTHROID, LEVOXYL) 50 mcg, Daily   • lisinopril 20 mg, oral, Daily   • magnesium glycinate 100 mg tablet 1 tablet, Nightly   • memantine (NAMENDA) 5 mg, 2 times daily   • metoprolol succinate XL (TOPROL-XL) 25 mg, oral, Daily, Do not crush or chew.   • QUEtiapine (SEROQUEL) 50 mg, Nightly   • tamsulosin (FLOMAX) 0.4 mg, Daily   • traMADol (ULTRAM) 50 mg, Daily PRN                        Assessment/Plan   1. Essential hypertension  Follow Up In Cardiology    Follow Up In Cardiology    metoprolol succinate XL (Toprol-XL) 25 mg 24 hr tablet    lisinopril 20 mg tablet      2. Mixed hyperlipidemia  atorvastatin (Lipitor) 40 mg tablet      3. Shortness of breath        4. Agatston coronary artery calcium score less than 100        5. BMI 22.0-22.9, adult        6. Former smoker                 Scribe Attestation  By signing my name below, IConcha LPN, Scribe   attest that this documentation has been prepared under the direction and in the presence of Rosangela Cancino MD.     Provider Attestation - Scribe documentation    All medical record entries made by the Scribe were at my direction and personally dictated by me. I have reviewed the chart and agree that the record accurately reflects my personal performance of the history, physical exam, discussion and plan.

## 2026-04-21 ENCOUNTER — APPOINTMENT (OUTPATIENT)
Dept: CARDIOLOGY | Facility: CLINIC | Age: 66
End: 2026-04-21
Payer: MEDICARE

## 2026-05-19 ENCOUNTER — APPOINTMENT (OUTPATIENT)
Dept: CARDIOLOGY | Facility: CLINIC | Age: 66
End: 2026-05-19
Payer: MEDICARE

## (undated) DEVICE — TUBING, SUCTION, 1/4" X 10', STRAIGHT: Brand: MEDLINE

## (undated) DEVICE — Device: Brand: ENDO SMARTCAP

## (undated) DEVICE — BRUSH ENDO CLN L90.5IN SHTH DIA1.7MM BRIST DIA5-7MM 2-6MM

## (undated) DEVICE — TUBE SET 96 MM 64 MM H2O PERISTALTIC STD AUX CHANNEL

## (undated) DEVICE — SINGLE PORT MANIFOLD: Brand: NEPTUNE 2